# Patient Record
(demographics unavailable — no encounter records)

---

## 2024-12-04 NOTE — HISTORY OF PRESENT ILLNESS
[de-identified] : MONTSE PÉREZ is a 37 year y/o F with PMH of depression who presents as a new patient today to establish care.  #Depression, complex PTSD Telehealth Psych managing wellbutrin 200 mg Has been on wellbutrin during the pandemic and it helped SSRIs were not good for her Wants to transition care to here moving forward No SI/HI Has therapist and good support around her  PMH: white coat syndrome, wore a holter and notes normal results, hernia at age 2 PSH: hernia repair, tonsillectomy Allergies: NKDA Medications: wellbutrin just increased to 200 from 150 mg, active B complex, magnesium supplements Fam Hx: dad  of MI at age 63, dad HTN. m gparents DM, had cancer this summer SCC colon, age 72.  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 no SI/HI Firearms: N  #Health Maintenance Flu shot: declines Covid vaccine: declines Tdap: thinks due -- would like tetanus  Pap: due. Last pre-covid, h/o abn with non-hi risk HPV, needs pap STI screen: not today Periods: manageable, no contraception needed

## 2024-12-04 NOTE — PLAN
[FreeTextEntry1] : #Depression - Will check-in about Wellbutrin dose at follow-up appointment - Continue therapy  #HM - Labs today - Appt for pap and tdap

## 2024-12-04 NOTE — PHYSICAL EXAM
[No Acute Distress] : no acute distress [Well-Appearing] : well-appearing [Normal Sclera/Conjunctiva] : normal sclera/conjunctiva [EOMI] : extraocular movements intact [No Lymphadenopathy] : no lymphadenopathy [Supple] : supple [Non Tender] : non-tender [Non-distended] : non-distended [Normal Bowel Sounds] : normal bowel sounds [Normal Posterior Cervical Nodes] : no posterior cervical lymphadenopathy [Normal Anterior Cervical Nodes] : no anterior cervical lymphadenopathy [Normal] : no rash [Coordination Grossly Intact] : coordination grossly intact [Normal Gait] : normal gait [Normal Affect] : the affect was normal [Normal Insight/Judgement] : insight and judgment were intact

## 2024-12-04 NOTE — HEALTH RISK ASSESSMENT
Surg PA [Yes] : Yes [Monthly or less (1 pt)] : Monthly or less (1 point) [1 or 2 (0 pts)] : 1 or 2 (0 points) [Never (0 pts)] : Never (0 points) [0] : 2) Feeling down, depressed, or hopeless: Not at all (0) [PHQ-2 Negative - No further assessment needed] : PHQ-2 Negative - No further assessment needed [Current] : Current [0-4] : 0-4 [NO] : No

## 2024-12-04 NOTE — ASSESSMENT
[FreeTextEntry1] : #Depression Uses telehealth service, recently increased dose of welbutrin from 150 mg to 200 mg in the setting of recent breakup. So far is doing okay. Has therapist, good friends, feels supported, no SI/HI. Would like to manage wellbutrin through primary care moving forward.  #HM Declines flu and covid vaccination, would like to get tdap vaccine. Due for pap. Does not need STI screen today, does not need contraception at this time.